# Patient Record
Sex: FEMALE | Race: WHITE | NOT HISPANIC OR LATINO | Employment: FULL TIME | URBAN - METROPOLITAN AREA
[De-identification: names, ages, dates, MRNs, and addresses within clinical notes are randomized per-mention and may not be internally consistent; named-entity substitution may affect disease eponyms.]

---

## 2023-10-17 ENCOUNTER — OFFICE VISIT (OUTPATIENT)
Dept: ORTHOPEDIC SURGERY | Facility: CLINIC | Age: 58
End: 2023-10-17
Payer: COMMERCIAL

## 2023-10-17 ENCOUNTER — ANCILLARY PROCEDURE (OUTPATIENT)
Dept: RADIOLOGY | Facility: CLINIC | Age: 58
End: 2023-10-17
Payer: COMMERCIAL

## 2023-10-17 DIAGNOSIS — S80.10XA CONTUSION OF KNEE AND LOWER LEG, INITIAL ENCOUNTER: ICD-10-CM

## 2023-10-17 DIAGNOSIS — S80.00XA CONTUSION OF KNEE AND LOWER LEG, INITIAL ENCOUNTER: ICD-10-CM

## 2023-10-17 DIAGNOSIS — S80.12XA CONTUSION OF KNEE AND LOWER LEG, LEFT, INITIAL ENCOUNTER: ICD-10-CM

## 2023-10-17 DIAGNOSIS — S80.02XA CONTUSION OF KNEE AND LOWER LEG, LEFT, INITIAL ENCOUNTER: ICD-10-CM

## 2023-10-17 DIAGNOSIS — M89.9 OSTEOPATHIA: ICD-10-CM

## 2023-10-17 DIAGNOSIS — L03.90 CELLULITIS, UNSPECIFIED CELLULITIS SITE: ICD-10-CM

## 2023-10-17 PROCEDURE — 73590 X-RAY EXAM OF LOWER LEG: CPT | Mod: LEFT SIDE | Performed by: FAMILY MEDICINE

## 2023-10-17 PROCEDURE — 73590 X-RAY EXAM OF LOWER LEG: CPT | Mod: LT

## 2023-10-17 PROCEDURE — 99213 OFFICE O/P EST LOW 20 MIN: CPT | Performed by: FAMILY MEDICINE

## 2023-10-17 PROCEDURE — 1036F TOBACCO NON-USER: CPT | Performed by: FAMILY MEDICINE

## 2023-10-17 RX ORDER — CYCLOBENZAPRINE HCL 10 MG
10 TABLET ORAL NIGHTLY PRN
Qty: 14 TABLET | Refills: 0 | Status: SHIPPED | OUTPATIENT
Start: 2023-10-17 | End: 2023-10-31

## 2023-10-17 RX ORDER — METHYLPREDNISOLONE 4 MG/1
TABLET ORAL
Qty: 1 EACH | Refills: 0 | Status: SHIPPED | OUTPATIENT
Start: 2023-10-17

## 2023-10-17 RX ORDER — CEPHALEXIN 500 MG/1
500 CAPSULE ORAL 3 TIMES DAILY
Qty: 21 CAPSULE | Refills: 0 | Status: SHIPPED | OUTPATIENT
Start: 2023-10-17 | End: 2023-10-24

## 2023-10-17 NOTE — PROGRESS NOTES
Acute Injury New Patient Visit    CC:   Chief Complaint   Patient presents with    Left Leg - Pain     Lt leg contusion  Tripped over sidewalk 10/8/23  Xrays today       HPI: Joelle is a 57 y.o.female who presents today with new complaints of left shin pain and swelling.  She tripped over the sidewalk banged her shin noted some bruising has a history of prior cellulitis from a similar type of injury seen about 3 years or so ago.  She presents here today for further evaluation.  She denies any numbness tingling or burning outside of the areas that are mildly red to the front of the shin.  She has a superficial abrasion.  She denies any significant ankle injury or knee injury otherwise.        Review of Systems   GENERAL: Negative for malaise, significant weight loss, fever  MUSCULOSKELETAL: See HPI  NEURO: Positive for numbness / tingling     Past Medical History  History reviewed. No pertinent past medical history.    Medication review  Medication Documentation Review Audit       Reviewed by Sun Jeter MA (Technologist) on 10/17/23 at 1350      Medication Order Taking? Sig Documenting Provider Last Dose Status            No Medications to Display                                   Allergies  Allergies   Allergen Reactions    Peanut Unknown       Social History  Social History     Socioeconomic History    Marital status: Single     Spouse name: Not on file    Number of children: Not on file    Years of education: Not on file    Highest education level: Not on file   Occupational History    Not on file   Tobacco Use    Smoking status: Never    Smokeless tobacco: Never   Substance and Sexual Activity    Alcohol use: Not on file    Drug use: Not on file    Sexual activity: Not on file   Other Topics Concern    Not on file   Social History Narrative    Not on file     Social Determinants of Health     Financial Resource Strain: Not on file   Food Insecurity: Not on file   Transportation Needs: Not on file   Physical  Activity: Not on file   Stress: Not on file   Social Connections: Not on file   Intimate Partner Violence: Not on file   Housing Stability: Not on file       Surgical History  History reviewed. No pertinent surgical history.    Physical Exam:  GENERAL:  Patient is awake, alert, and oriented to person place and time.  Patient appears well nourished and well kept.  Affect Calm, Not Acutely Distressed.  HEENT:  Normocephalic, Atraumatic, EOMI  CARDIOVASCULAR:  Hemodynamically stable.  RESPIRATORY:  Normal respirations with unlabored breathing.  NEURO: Gross sensation intact to the lower extremities bilaterally.  Extremity: Left lower extremity exam: On inspection the anterior aspect of the shin has 2 areas of small redness and mild erythema without streaking cellulitis.  There are superficial abrasions and some mild honey crusting to the anterior aspect of the proximal lesion.  The distal 1 which is similar from prior with a little bit of redness and she has no pain or discomfort to the calf.  Negative tib-fib squeeze full range of motion and strength about the knee.  Pulses and sensation are intact throughout.      Diagnostics: See dictated report from today  XR tibia fibula left 2 views  Interpreted By:  Budinsky, Cole,   STUDY:  XR TIBIA FIBULA LEFT 2 VIEWS;  ;  10/17/2023 1:55 pm      INDICATION:  Signs/Symptoms:pain.      ACCESSION NUMBER(S):  [id number]      ORDERING CLINICIAN:  COLE BUDINSKY      FINDINGS:  Left tib-fib films demonstrate no presence for fracture or  dislocation. Normal joint space and alignment noted. No significant  soft tissue swelling appreciated. No obvious foreign body. Overall  impression unremarkable left tib-fib.          Signed by: Cole Budinsky 10/17/2023 5:56 PM  Dictation workstation:   [id number]      XR tibia fibula left 2 views          Interpreted By:  Budinsky, Cole,   STUDY:  XR TIBIA FIBULA LEFT 2 VIEWS;  ;  10/17/2023 1:55 pm      INDICATION:  Signs/Symptoms:pain.       ACCESSION NUMBER(S):  [id number]      ORDERING CLINICIAN:  COLE BUDINSKY      FINDINGS:  Left tib-fib films demonstrate no presence for fracture or  dislocation. Normal joint space and alignment noted. No significant  soft tissue swelling appreciated. No obvious foreign body. Overall  impression unremarkable left tib-fib.          Signed by: Cole Budinsky 10/17/2023 5:56 PM  Dictation workstation:   [id number]             Procedure: None  Procedures    Assessment:   Problem List Items Addressed This Visit    None  Visit Diagnoses       Contusion of knee and lower leg, left, initial encounter        Relevant Medications    cephalexin (Keflex) 500 mg capsule    methylPREDNISolone (Medrol Dospak) 4 mg tablets    cyclobenzaprine (Flexeril) 10 mg tablet    Other Relevant Orders    XR tibia fibula left 2 views (Completed)    Contusion of knee and lower leg, initial encounter        Relevant Medications    cephalexin (Keflex) 500 mg capsule    methylPREDNISolone (Medrol Dospak) 4 mg tablets    cyclobenzaprine (Flexeril) 10 mg tablet    Osteopathia        Relevant Medications    cephalexin (Keflex) 500 mg capsule    methylPREDNISolone (Medrol Dospak) 4 mg tablets    cyclobenzaprine (Flexeril) 10 mg tablet    Other Relevant Orders    Referral to [facility]    Cellulitis, unspecified cellulitis site        Relevant Medications    cephalexin (Keflex) 500 mg capsule    methylPREDNISolone (Medrol Dospak) 4 mg tablets    cyclobenzaprine (Flexeril) 10 mg tablet             Plan: At this time we will offer the patient a short course of antibiotics Keflex 503 times a day for 10 days.  We will also offer her a muscle relaxer and a steroid from her fall.  She additionally would like to have a referral for osteopathic manipulation treatments, we will place a referral through the [facility] network.  I will see her back as needed, if any worsening issues or concerns she is to call and schedule follow-up certainly if she  develops worsening redness fevers or chills.  Orders Placed This Encounter    XR tibia fibula left 2 views    Referral to [facility]    cephalexin (Keflex) 500 mg capsule    methylPREDNISolone (Medrol Dospak) 4 mg tablets    cyclobenzaprine (Flexeril) 10 mg tablet      At the conclusion of the visit there were no further questions by the patient/family regarding their plan of care.  Patient was instructed to call or return with any issues, questions, or concerns regarding their injury and/or treatment plan described above.     10/17/23 at 6:17 PM - Cole C Budinsky, MD    Office: [phone]    This note was prepared using voice recognition software.  The details of this note are correct and have been reviewed, and corrected to the best of my ability.  Some grammatical errors may persist related to the Dragon software.

## 2024-08-05 ENCOUNTER — HOSPITAL ENCOUNTER (OUTPATIENT)
Dept: RADIOLOGY | Facility: CLINIC | Age: 59
Discharge: HOME | End: 2024-08-05
Payer: COMMERCIAL

## 2024-08-05 ENCOUNTER — OFFICE VISIT (OUTPATIENT)
Dept: ORTHOPEDIC SURGERY | Facility: CLINIC | Age: 59
End: 2024-08-05
Payer: COMMERCIAL

## 2024-08-05 VITALS — WEIGHT: 241 LBS | BODY MASS INDEX: 42.7 KG/M2 | HEIGHT: 63 IN

## 2024-08-05 DIAGNOSIS — M54.2 NECK PAIN: ICD-10-CM

## 2024-08-05 PROCEDURE — 99214 OFFICE O/P EST MOD 30 MIN: CPT | Performed by: PHYSICIAN ASSISTANT

## 2024-08-05 PROCEDURE — 72050 X-RAY EXAM NECK SPINE 4/5VWS: CPT | Performed by: INTERNAL MEDICINE

## 2024-08-05 PROCEDURE — 3008F BODY MASS INDEX DOCD: CPT | Performed by: PHYSICIAN ASSISTANT

## 2024-08-05 PROCEDURE — 72050 X-RAY EXAM NECK SPINE 4/5VWS: CPT
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
## 2024-08-19 ENCOUNTER — HOSPITAL ENCOUNTER (OUTPATIENT)
Dept: RADIOLOGY | Facility: CLINIC | Age: 59
End: 2024-08-19
Payer: COMMERCIAL